# Patient Record
Sex: MALE | Race: NATIVE HAWAIIAN OR OTHER PACIFIC ISLANDER
[De-identification: names, ages, dates, MRNs, and addresses within clinical notes are randomized per-mention and may not be internally consistent; named-entity substitution may affect disease eponyms.]

---

## 2019-02-27 ENCOUNTER — HOSPITAL ENCOUNTER (EMERGENCY)
Dept: HOSPITAL 5 - ED | Age: 19
LOS: 1 days | Discharge: HOME | End: 2019-02-28
Payer: COMMERCIAL

## 2019-02-27 DIAGNOSIS — Z87.891: ICD-10-CM

## 2019-02-27 DIAGNOSIS — Z21: ICD-10-CM

## 2019-02-27 DIAGNOSIS — K62.5: Primary | ICD-10-CM

## 2019-02-27 LAB
BASOPHILS # (AUTO): 0.1 K/MM3 (ref 0–0.1)
BASOPHILS NFR BLD AUTO: 1 % (ref 0–1.8)
BUN SERPL-MCNC: 9 MG/DL (ref 9–20)
BUN/CREAT SERPL: 10 %
CALCIUM SERPL-MCNC: 9.4 MG/DL (ref 8.4–10.2)
EOSINOPHIL # BLD AUTO: 0.2 K/MM3 (ref 0–0.4)
EOSINOPHIL NFR BLD AUTO: 3 % (ref 0–4.3)
HCT VFR BLD CALC: 43.5 % (ref 35.5–45.6)
HEMOLYSIS INDEX: 16
HGB BLD-MCNC: 14.9 GM/DL (ref 11.8–15.2)
LYMPHOCYTES # BLD AUTO: 2.2 K/MM3 (ref 1.2–5.4)
LYMPHOCYTES NFR BLD AUTO: 34.1 % (ref 13.4–35)
MCHC RBC AUTO-ENTMCNC: 34 % (ref 32–34)
MCV RBC AUTO: 88 FL (ref 84–94)
MONOCYTES # (AUTO): 0.4 K/MM3 (ref 0–0.8)
MONOCYTES % (AUTO): 5.7 % (ref 0–7.3)
PLATELET # BLD: 306 K/MM3 (ref 140–440)
RBC # BLD AUTO: 4.93 M/MM3 (ref 3.65–5.03)

## 2019-02-27 PROCEDURE — 82271 OCCULT BLOOD OTHER SOURCES: CPT

## 2019-02-27 PROCEDURE — 99284 EMERGENCY DEPT VISIT MOD MDM: CPT

## 2019-02-27 PROCEDURE — 36415 COLL VENOUS BLD VENIPUNCTURE: CPT

## 2019-02-27 PROCEDURE — 85025 COMPLETE CBC W/AUTO DIFF WBC: CPT

## 2019-02-27 PROCEDURE — 80048 BASIC METABOLIC PNL TOTAL CA: CPT

## 2019-02-27 PROCEDURE — 74177 CT ABD & PELVIS W/CONTRAST: CPT

## 2019-02-27 NOTE — EMERGENCY DEPARTMENT REPORT
Blank Doc





- Documentation


Documentation: 





Rectal bleeding and pain since  Monday.  Has rectal intercourse. Recently dx w

ith HIV in October 2018. 








This initial assessment diagnostic orders/clinical plan/treatment (s) is/Are 

subject change based on patient's health status, clinical progression and re-

assessment by fellow clinical providers in the ED. Further treatment and work-up

at subsequent clinical providers discretion.  Patient/guardians urged not to 

elope from s their condition may be serious if not clinically assessed and 

managed.  Inital order include:

## 2019-02-27 NOTE — EMERGENCY DEPARTMENT REPORT
HPI





- General


Chief Complaint: GI Bleed


Time Seen by Provider: 19 19:27





- HPI


HPI: 





Room 8





The patient is a 18-year-old male presenting with chief complaint of 

hematochezia.  The patient states he has had hematochezia for the past 3 days.  

Patient admits to receiving intercourse several days this week before and after 

the onset of his symptoms.  Patient admits to rectal pain during bowel 

movements.  Patient denies abdominal pain nausea vomiting or fever. 








Location: Rectum


Duration: 3 days


Quality:  [See above]


Severity: Moderate


Modifying factors: [see above]


Context: [see above]


Mode of transportation: [not driving]





ED Past Medical Hx





- Past Medical History


Previous Medical History?: No


Hx HIV: Yes (on anti-viral [place] last CD4 count was normal)





- Surgical History


Past Surgical History?: Yes


Additional Surgical History: Right ear surgery





- Family History


Family history: no significant





- Social History


Smoking Status: Former Smoker (none since 2018)


Substance Use Type: None (denies illicit drug use), Alcohol (occasional)





- Medications


Home Medications: 


                                Home Medications











 Medication  Instructions  Recorded  Confirmed  Last Taken  Type


 


Hydrocortisone [Anucort-HC SUPPOS] 25 mg RC BID #10 supp.rect 19  Unknown 

Rx


 


traMADol [Ultram] 50 mg PO Q6HR PRN #10 tablet 19  Unknown Rx














ED Review of Systems


ROS: 


Stated complaint: BLOOD IN STOOL


Other details as noted in HPI





Comment: All other systems reviewed and negative


Constitutional: no symptoms reported


Eyes: denies: as per HPI


ENT: denies: throat pain


Respiratory: no symptoms reported


Cardiovascular: denies: chest pain


Endocrine: no symptoms reported


Gastrointestinal: hematochezia.  denies: abdominal pain, nausea, vomiting


Musculoskeletal: denies: back pain


Neurological: denies: headache





Physical Exam





- Physical Exam


Vital Signs: 


                                   Vital Signs











  19





  19:20 19:28 22:01


 


Temperature 98.1 F 98.1 F 


 


Pulse Rate 76 76 


 


Respiratory 16 18 18





Rate   


 


Blood Pressure  119/74 


 


Blood Pressure 119/74  





[Left]   


 


O2 Sat by Pulse 98 98 99





Oximetry   











Physical Exam: 





GENERAL: The patient is well-developed well-nourished male lying on stretcher 

not appearing to be in acute distress. []


HEENT: Normocephalic.  Atraumatic.  Extraocular motions are intact.  Patient has

 moist mucous membranes.


NECK: Supple.  Trachea midline


CHEST/LUNGS: Clear to auscultation.  There is no respiratory distress noted.


HEART/CARDIOVASCULAR: Regular.  There is no tachycardia.  There is no gallop rub

 or murmur.


ABDOMEN: Abdomen is soft, nontender.  Patient has normal bowel sounds.  There is

 no abdominal distention.


SKIN: There is no rash.  There is no edema.  There is no diaphoresis.


NEURO: The patient is awake, alert, and oriented.  The patient is cooperative. 

The patient has normal speech


MUSCULOSKELETAL:  There is no evidence of acute injury.


RECTAL: No anal tears or fissures.  No external hemorrhoids visualized.  Guaiac 

positive





ED Course


                                   Vital Signs











  19





  19:20 19:28 22:01


 


Temperature 98.1 F 98.1 F 


 


Pulse Rate 76 76 


 


Respiratory 16 18 18





Rate   


 


Blood Pressure  119/74 


 


Blood Pressure 119/74  





[Left]   


 


O2 Sat by Pulse 98 98 99





Oximetry   














ED Medical Decision Making





- Lab Data


Result diagrams: 


                                 19 20:21





                                 19 23:25





- Radiology Data


Radiology results: report reviewed (CT abdomen and pelvis), image reviewed (CT 

abdomen and pelvis)





[facility] [address] [place] [address] Cat

 Scan Report Signed Patient: FOX GLASS MR#: [id number] : 

2000 Acct:[id number] Age/Sex: 18 / M ADM Date: 19 Loc: ED 

Attending Dr: Ordering Physician: LEBRON MARK MD Date of Service: 19 

Procedure(s): CT abdomen pelvis w con Accession Number(s): [id number] cc: LEBRON MARK MD FINAL REPORT PROCEDURE: CT ABDOMEN PELVIS W CON TECHNIQUE: Computerized

 axial tomography of the abdomen and pelvis was performed after the IV injection

 of iodinated nonionic contrast. HISTORY: hematochezia, history of anal 

intercourse COMPARISON: No prior studies are available for comparison. FINDINGS:

 Visualized lower thorax: No significant abnormality. Liver: Normal size and 

attenuation. Spleen: Normal size and attenuation. Gallbladder and biliary 

system: Normal. Pancreas: Normal. Adrenals: Normal. Kidneys: Normal. GI tract: 

The stomach is normal. The small bowel has a normal caliber. The cecum, appendix

 colon are normal.. Lymph nodes and mesentery: Normal. Vasculature: Normal. 

Bladder: Normal. Reproductive organs: Normal. Peritoneum: No free fluid. 

Musculoskeletal structures: No significant abnormality. Other: None. IMPRESSION:

 There is no evidence intestinal or urinary tract obstruction. No ileus or 

enteritis. The appendix is normal. Transcribed By: [facility] Dictated By: RACHELLE KERN MD Electronically Authenticated By: RACHELLE KERN MD Signed Date/Time:

 19 DD/DT: 19 TD/TT: 19 





- Differential Diagnosis


anal fissure, anal tear, internal hemorrhoids, external hemorrhoids


Critical care attestation.: 


If time is entered above; I have spent that time in minutes in the direct care 

of this critically ill patient, excluding procedure time.








ED Disposition


Clinical Impression: 


 Rectal bleeding





Disposition: - TO HOME OR SELFCARE


Is pt being admited?: No


Does the pt Need Aspirin: No


Condition: Stable


Instructions:  Rectal Bleeding (ED)


Additional Instructions: 


Return to the emergency department immediately should you develop worsening 

symptoms, fever, inability to tolerate food or liquid or any other concerns.


Prescriptions: 


Hydrocortisone [Anucort-HC SUPPOS] 25 mg RC BID #10 supp.rect


traMADol [Ultram] 50 mg PO Q6HR PRN #10 tablet


 PRN Reason: Pain


Referrals: 


SAL COLBERT MD [Referring] - 3-5 Days


JOHNIE IVEY MD [Staff Physician] - 3-5 Days (Dr. Ivey is a 

gastroenterologist.  Please follow-up with him for further evaluation)


Forms:  Accompanied Note


Time of Disposition: 01:32

## 2019-02-28 VITALS — SYSTOLIC BLOOD PRESSURE: 109 MMHG | DIASTOLIC BLOOD PRESSURE: 77 MMHG

## 2019-02-28 NOTE — CAT SCAN REPORT
FINAL REPORT



PROCEDURE:  CT ABDOMEN PELVIS W CON



TECHNIQUE:  Computerized axial tomography of the abdomen and pelvis was performed after the IV inject
ion of iodinated nonionic contrast. 



HISTORY:  hematochezia, history of anal intercourse 



COMPARISON:  No prior studies are available for comparison.



FINDINGS:  

Visualized lower thorax: No significant abnormality.



Liver: Normal size and attenuation.



Spleen: Normal size and attenuation.



Gallbladder and biliary system: Normal.



Pancreas: Normal.



Adrenals: Normal.



Kidneys: Normal.



GI tract: The stomach is normal. The small bowel has a normal caliber. The cecum, appendix colon are 
normal..



Lymph nodes and mesentery: Normal. 



Vasculature: Normal.



Bladder: Normal.



Reproductive organs: Normal.



Peritoneum: No free fluid.



Musculoskeletal structures: No significant abnormality.



Other: None.  



IMPRESSION:  

There is no evidence intestinal or urinary tract obstruction. No ileus or enteritis. The appendix is 
normal.